# Patient Record
Sex: MALE | Race: WHITE | Employment: FULL TIME | URBAN - METROPOLITAN AREA
[De-identification: names, ages, dates, MRNs, and addresses within clinical notes are randomized per-mention and may not be internally consistent; named-entity substitution may affect disease eponyms.]

---

## 2017-08-04 ENCOUNTER — APPOINTMENT (OUTPATIENT)
Dept: GENERAL RADIOLOGY | Age: 67
End: 2017-08-04
Attending: EMERGENCY MEDICINE
Payer: MEDICARE

## 2017-08-04 ENCOUNTER — HOSPITAL ENCOUNTER (OUTPATIENT)
Age: 67
Discharge: HOME | End: 2017-08-04
Attending: EMERGENCY MEDICINE
Payer: MEDICARE

## 2017-08-04 VITALS
BODY MASS INDEX: 32.14 KG/M2 | DIASTOLIC BLOOD PRESSURE: 88 MMHG | WEIGHT: 217 LBS | HEIGHT: 69 IN | HEART RATE: 72 BPM | RESPIRATION RATE: 16 BRPM | OXYGEN SATURATION: 99 % | SYSTOLIC BLOOD PRESSURE: 150 MMHG | TEMPERATURE: 98 F

## 2017-08-04 DIAGNOSIS — J06.9 UPPER RESPIRATORY TRACT INFECTION, UNSPECIFIED TYPE: Primary | ICD-10-CM

## 2017-08-04 PROCEDURE — 71020 XR CHEST PA + LAT CHEST (CPT=71020): CPT | Performed by: EMERGENCY MEDICINE

## 2017-08-04 PROCEDURE — 99203 OFFICE O/P NEW LOW 30 MIN: CPT

## 2017-08-04 PROCEDURE — 99204 OFFICE O/P NEW MOD 45 MIN: CPT

## 2017-08-04 RX ORDER — TAMSULOSIN HYDROCHLORIDE 0.4 MG/1
CAPSULE ORAL DAILY
COMMUNITY

## 2017-08-04 RX ORDER — PREDNISONE 20 MG/1
40 TABLET ORAL DAILY
Qty: 10 TABLET | Refills: 0 | Status: SHIPPED | OUTPATIENT
Start: 2017-08-04 | End: 2017-08-09

## 2017-08-04 RX ORDER — AZITHROMYCIN 250 MG/1
TABLET, FILM COATED ORAL
Qty: 1 PACKAGE | Refills: 0 | Status: SHIPPED | OUTPATIENT
Start: 2017-08-04 | End: 2017-08-09

## 2017-08-04 RX ORDER — BENZONATATE 100 MG/1
100 CAPSULE ORAL 3 TIMES DAILY PRN
Qty: 30 CAPSULE | Refills: 0 | Status: SHIPPED | OUTPATIENT
Start: 2017-08-04 | End: 2017-09-03

## 2017-08-04 NOTE — ED PROVIDER NOTES
Patient Seen in: [address]    History   Patient presents with:  Cough/URI    Stated Complaint: cough, post nasal drip     HPI    44-year-old male presents for complaint of cough for the past 3 weeks.   He states the coug daily.   Omega-3-acid Ethyl Esters (LOVAZA OR),  Take  by mouth.        Family History   Problem Relation Age of Onset   • Heart Disorder Father    • Cancer Father      Prostate   • Cancer Mother      Colon       Smoking status: Never Smoker distal pulses. No murmur heard. Pulmonary/Chest: Effort normal and breath sounds normal. No accessory muscle usage. No respiratory distress. He has no wheezes. He has no rales. Musculoskeletal: Normal range of motion.    Neurological: He is alert and follow up with PCP for reevaluation. Return precautions were given. Patient voices understanding and agreement with the treatment plan. All questions were addressed and answered.                Disposition and Plan     Clinical Impression:  Upper respirator

## 2017-08-04 NOTE — ED INITIAL ASSESSMENT (HPI)
Patient states having dry cough and nasal drainage x 3 weeks. Patient denies fever, SOB, chest pain, sore throat. Patient c/o intermittent right ear pain. Patient states seeing PCP x 1 week ago and notified of elevated WBCs.

## 2019-03-25 ENCOUNTER — ORDER TRANSCRIPTION (OUTPATIENT)
Dept: SLEEP CENTER | Age: 69
End: 2019-03-25

## 2019-03-25 ENCOUNTER — OFFICE VISIT (OUTPATIENT)
Dept: SLEEP CENTER | Age: 69
End: 2019-03-25
Attending: INTERNAL MEDICINE
Payer: MEDICARE

## 2019-03-25 DIAGNOSIS — G47.33 OSA (OBSTRUCTIVE SLEEP APNEA): Primary | ICD-10-CM

## 2019-03-25 DIAGNOSIS — Z76.89 SLEEP CONCERN: Primary | ICD-10-CM

## 2019-03-25 PROCEDURE — 95811 POLYSOM 6/>YRS CPAP 4/> PARM: CPT

## 2019-04-10 NOTE — PROCEDURES
[facility]  Accredited by the [employer] ([employer]ASM)    PATIENT'S NAME: Suzanna Brand PHYSICIAN:    REFERRING PHYSICIAN:    PATIENT ACCOUNT #: [de-identified] LOCATION: [place] [address] #: [id number] absent. ASSESSMENT:    1.   Moderate to severe obstructive sleep apnea with apnea-hypopnea index of 29 events an hour. 2.   Sleep-disordered breathing, worse in REM sleep. 3.   Moderate hypoxemia. 4.   Moderate sleep fragmentation. PLAN:    1.    Valeta Rank

## 2025-01-10 ENCOUNTER — ORDER TRANSCRIPTION (OUTPATIENT)
Dept: SLEEP CENTER | Age: 75
End: 2025-01-10

## 2025-01-10 DIAGNOSIS — G47.33 OBSTRUCTIVE SLEEP APNEA (ADULT) (PEDIATRIC): Primary | ICD-10-CM

## 2025-01-10 DIAGNOSIS — G47.33 OBSTRUCTIVE SLEEP APNEA (ADULT) (PEDIATRIC): ICD-10-CM

## 2025-01-10 DIAGNOSIS — Z01.810 PRE-OPERATIVE CARDIOVASCULAR EXAMINATION: ICD-10-CM

## 2025-02-19 ENCOUNTER — OFFICE VISIT (OUTPATIENT)
Dept: SLEEP CENTER | Age: 75
End: 2025-02-19
Attending: DENTIST
Payer: MEDICARE

## 2025-02-19 DIAGNOSIS — G47.33 OBSTRUCTIVE SLEEP APNEA (ADULT) (PEDIATRIC): ICD-10-CM

## 2025-02-19 PROCEDURE — 95806 SLEEP STUDY UNATT&RESP EFFT: CPT

## 2025-02-21 NOTE — PROCEDURES
[facility]  Accredited by the [employer] Medicine (AASM)    PATIENT'S NAME: EULALIA FLORES   ATTENDING PHYSICIAN: Shahriar Bo   REFERRING PHYSICIAN: Aaron Deluca   PATIENT ACCOUNT #: [id number] LOCATION: Sleep Center   MEDICAL RECORD #: [id number] YOB: 1950   DATE OF STUDY: 02/19/2025       SLEEP STUDY REPORT    STUDY TYPE:  Home sleep test with oral appliance therapy.    ATTENDING PHYSICIAN:  Dr. Shahriar Bo.    ORDERING PROVIDER:  Dr. Aaron Deluca at [facility].    INDICATION:  Obstructive sleep apnea (ICD-10 code G47.33) now for evaluation while wearing oral appliance therapy, Panthera D-SAD, with centric protrusion equivalent to 9 mm.    RESULTS:  The patient underwent home sleep test with measurement of his nasal airflow, chest and abdominal wall motion, oximetry, and body position.  I have reviewed the entirety of the raw data of this study.      During the study, the total recording time is 450 minutes.  The lights-out clock time is 10:24 p.m., the lights-on clock time is 5:55 a.m.  The apnea plus hypopnea index is 22.6 events per hour.  The supine apnea plus hypopnea index is 55.3 events per hour.  The average oxygen saturation is 93%, the lowest oxygen saturation is 87%, and the patient spent 0.2% of the test with saturations 88% or less.  The average heart rate is 52 beats per minute, and the patient spent approximately 10% of the test in the supine position.    INTERPRETATION:  The data generated from this study demonstrates persistent moderate obstructive sleep apnea which is worse in the supine position (ICD-10 code G47.33).  It is notable that the overall apnea plus hypopnea index had improved from 29 to 22 events per hour.    RECOMMENDATIONS:    1.   Follow up with [facility] for consideration of device adjustment.    2.   Avoid alcohol.   3.   Avoid sedating drug.    4.   Patient should not drive if at all  [place].    5.   Avoid the supine position.     Please do not hesitate to contact me if there is any question whatsoever regarding interpretation of this study.     Dictated By Ryan Canela MD  d: 02/21/2025 11:54:07  t: 02/21/2025 12:47:12  [facility] [id number]  [facility]/    cc: Dr. Shahriar Deluca

## 2025-06-27 ENCOUNTER — ORDER TRANSCRIPTION (OUTPATIENT)
Dept: SLEEP CENTER | Age: 75
End: 2025-06-27

## 2025-06-27 DIAGNOSIS — G47.33 OBSTRUCTIVE SLEEP APNEA (ADULT) (PEDIATRIC): Primary | ICD-10-CM

## 2025-07-30 ENCOUNTER — OFFICE VISIT (OUTPATIENT)
Dept: SLEEP CENTER | Age: 75
End: 2025-07-30
Attending: DENTIST
Payer: MEDICARE

## 2025-07-30 DIAGNOSIS — G47.33 OBSTRUCTIVE SLEEP APNEA (ADULT) (PEDIATRIC): ICD-10-CM

## 2025-07-30 PROCEDURE — 95806 SLEEP STUDY UNATT&RESP EFFT: CPT
